# Patient Record
Sex: MALE | Race: WHITE | HISPANIC OR LATINO | URBAN - METROPOLITAN AREA
[De-identification: names, ages, dates, MRNs, and addresses within clinical notes are randomized per-mention and may not be internally consistent; named-entity substitution may affect disease eponyms.]

---

## 2022-12-21 ENCOUNTER — EMERGENCY (EMERGENCY)
Facility: HOSPITAL | Age: 22
LOS: 1 days | Discharge: HOME | End: 2022-12-21
Attending: EMERGENCY MEDICINE | Admitting: INTERNAL MEDICINE
Payer: COMMERCIAL

## 2022-12-21 VITALS
RESPIRATION RATE: 18 BRPM | WEIGHT: 130.07 LBS | HEIGHT: 66 IN | SYSTOLIC BLOOD PRESSURE: 128 MMHG | TEMPERATURE: 100 F | DIASTOLIC BLOOD PRESSURE: 51 MMHG | OXYGEN SATURATION: 99 % | HEART RATE: 118 BPM

## 2022-12-21 DIAGNOSIS — J36 PERITONSILLAR ABSCESS: ICD-10-CM

## 2022-12-21 PROCEDURE — 99284 EMERGENCY DEPT VISIT MOD MDM: CPT

## 2022-12-21 RX ORDER — IBUPROFEN 200 MG
600 TABLET ORAL ONCE
Refills: 0 | Status: COMPLETED | OUTPATIENT
Start: 2022-12-21 | End: 2022-12-21

## 2022-12-21 RX ADMIN — Medication 600 MILLIGRAM(S): at 23:35

## 2022-12-21 NOTE — CONSULT NOTE ADULT - SUBJECTIVE AND OBJECTIVE BOX
throat pain. suspected peritonsillar abcess.   Reason for Consult/Chief Complaint:    HEALTH ISSUES - PROBLEM Dx:        PAST MEDICAL & SURGICAL HISTORY:    Physical Exam  Vital Signs Last 24 Hrs  T(C): 37.7 (21 Dec 2022 20:30), Max: 37.7 (21 Dec 2022 20:30)  T(F): 99.8 (21 Dec 2022 20:30), Max: 99.8 (21 Dec 2022 20:30)  HR: 118 (21 Dec 2022 20:30) (118 - 118)  BP: 128/51 (21 Dec 2022 20:30) (128/51 - 128/51)  BP(mean): --  RR: 18 (21 Dec 2022 20:30) (18 - 18)  SpO2: 99% (21 Dec 2022 20:30) (99% - 99%)    Parameters below as of 21 Dec 2022 20:30  Patient On (Oxygen Delivery Method): room air        General: well developed, well nourished patient in mild  apparent distress. Communication and voice - normal.   Head and Face: no swellings, lesions, tumors of masses. No tenderness to palpation or percussion. Facial strenth is normal.    Ears: external auditory canals, Tympanic membranes and middle ear space appeared normal bilaterally, normal hearing to speech. inspection of outer ear normal.   Nose: no signs of infection, bleeding or polyps. septum fairly midline. no signs of infection, polyps or bleeding.   Oral cavity / Oropharynx: tonsillitis with right peritonsillar bulge.   Neck: no lesions tumors, masses or swollen glands.   Thyroid - no nodules or thyromegaly noted.     Abnormal findings:  GERD ( Gastroesphageal Reflux) and LPR ( Laryngopharyngeal reflux) findings were noted - mild irregularity and heaped up mucosa in the post-cricoid region.     Radiologic studies:

## 2022-12-21 NOTE — ED PROVIDER NOTE - PROGRESS NOTE DETAILS
Pt was seen by Dr. Luis, ENT who drained abscess, cx sent, advised can d/c home on clindamycin and f/u office. Reevaluated patient at bedside.  Patient feeling much improved. An opportunity to ask questions was given.  Discussed the importance of prompt, close medical follow-up.  Patient will return with any changes, concerns or persistent / worsening symptoms.  Understanding of all instructions verbalized. Pt was seen by Dr. Luis, ENT who performed I&D of abscess at bedside, cx sent, advised can d/c home on clindamycin and f/u office.

## 2022-12-21 NOTE — ED PROVIDER NOTE - OBJECTIVE STATEMENT
22-year-old male with no past medical history presents with complaint of throat pain x3 days.  States that patient was seen in urgent care and tested positive for strep, was given amoxicillin however states that pain and swelling has worsened.  States that he went to urgent care again tonight and sent to ED for evaluation.  Denies fever, nausea, vomiting, abdominal pain, difficulty breathing, drooling.  States that he is able to swallow liquids however with pain.

## 2022-12-21 NOTE — ED PROVIDER NOTE - NSFOLLOWUPINSTRUCTIONS_ED_ALL_ED_FT
Follow-up with ENT for reevaluation, ongoing care and treatment.  Rest, drink plenty of fluids, take full course of antibiotics as prescribed.  Take Tylenol or Motrin as directed for pain. If having worsening of symptoms, difficulty breathing, unable to swallow liquids or other related symptoms, return to the ER immediately.    Peritonsillar Abscess    An open mouth showing the tonsils.   A peritonsillar abscess is an infected area in your throat that is filled with pus. It forms behind your tonsils.      What are the causes?    This condition is usually caused by streptococcal bacteria.      What increases the risk?    •A recent diagnosis of an infection in your mouth or throat.      •Being a smoker.      •Having gum disease or gingivitis (periodontal disease).        What are the signs or symptoms?    Early symptoms of this condition include:  •Fever and chills.      •A sore throat, often with pain on just one side.      •Swollen, tender glands (lymph nodes) in the neck.      •Headache.      As the infection gets worse, symptoms may include:  •Difficulty swallowing.      •Drooling because of difficulty swallowing saliva.      •Difficulty opening your mouth.      •Bad breath.      •Changes in how the voice sounds.        How is this treated?    •Draining the pus. Your doctor may do this with a syringe and a needle or by making a cut in the abscess.      •Using antibiotic medicine.        Follow these instructions at home:    Medicines     •Take over-the-counter and prescription medicines only as told by your doctor.      •If you were prescribed an antibiotic, take it as told by your doctor. Do not stop taking the antibiotic even if you start to feel better.        Eating and drinking   A diet of soft foods, including apple sauce, yogurt, and a smoothie.    •Drink enough fluid to keep your pee (urine) pale yellow.    •While your throat is sore, try one of these:  •Only drinking liquids.      •Eating only soft foods, such as yogurt and ice cream.        General instructions     •Rest as told by your doctor.      •Return to your normal activities as told by your doctor. Ask your doctor what activities are safe for you.    •If your abscess was drained, rinse your mouth often with salt water.  •To make salt water, dissolve ½–1 tsp (3–6 g) of salt in 1 cup (237 mL) of warm water.      •Do not swallow this mixture.        • Do not smoke or use any products that contain nicotine or tobacco. If you need help quitting, ask your doctor.      •Keep all follow-up visits.        Contact a doctor if:    •You have more pain, swelling, redness, or pus in your throat.      •You have a headache.      •You have low energy or feel generally sick.      •You have a fever or chills.      •You have trouble swallowing or eating.    •You have signs of not enough water in the body (dehydration), such as:  •Feeling light-headed or dizzy when you are standing.      •Peeing (urinating) less than usual.      •A fast heart rate.      •Dry mouth.          Get help right away if:    •You are unable to swallow.      •You have trouble breathing.      •Breathing is easier when you lean forward.      •You cough up blood.      •You vomit blood.      •You have very bad throat pain and it does not get better with medicine.      These symptoms may be an emergency. Get help right away. Call your local emergency services (911 in the [place]   • Do not wait to see if the symptoms will go away.       • Do not drive yourself to the hospital.         Summary    •A peritonsillar abscess is an infected area in your throat that is filled with pus.      •You may be treated by having the abscess drained and by taking antibiotic medicine.      •Contact a doctor if you have trouble swallowing or eating.      •Get help right away if you cough up blood or you vomit blood.      This information is not intended to replace advice given to you by your health care provider. Make sure you discuss any questions you have with your health care provider.

## 2022-12-21 NOTE — ED PROVIDER NOTE - CLINICAL SUMMARY MEDICAL DECISION MAKING FREE TEXT BOX
Patient complaining of sore throat since December 18.  Patient was seen that day in urgent care had a positive strep test was given amoxicillin however pain and swelling has worsened went to the urgent care tonight and they referred patient to the ER.  No fevers chills nausea vomiting.  Patient tolerating p.o.    Patient with peritonsillar abscess on exam will consult ENT for drainage.

## 2022-12-21 NOTE — ED PROVIDER NOTE - CARE PROVIDER_API CALL
Les Luis)  Otolaryngology  [address] [place] [id number]  Phone: [phone]  Fax: [phone]  Follow Up Time: 1-3 Days

## 2022-12-21 NOTE — CONSULT NOTE ADULT - ASSESSMENT
right peritonsillar abcess.   discussed with patient and family this condition, treatment options and risks benefits and complications. agree with draining abcess. hurricaine spray. sensrcaine with 1:200,000 epi injection. needle aspiration of 4 cc pus, sent for culture. cavity opened with hemostat and more pus aspirated and suctioned.

## 2022-12-21 NOTE — ED ADULT NURSE NOTE - OBJECTIVE STATEMENT
pt is A&Ox4, brought in to the ER for sore throat, abscess noted, ENT at the bedside, pt appears in no acute distress, resp even and unlabored, nad noted, will continue to monitor

## 2022-12-21 NOTE — ED PROVIDER NOTE - ENMT, MLM
Airway patent. Mouth with normal mucosa. +right peritonsillar swelling with erythema and abscess noted, no drooling/stridor/hoarseness noted, swallowing secretions and speaking clearly in full sentences

## 2022-12-21 NOTE — ED ADULT TRIAGE NOTE - CHIEF COMPLAINT QUOTE
Pt went to urgent care 4 days ago and dx with strep throat. pt has been taking amoxicillin but its not getting better. Pt advised to come to ED for possible abscess in tonsil. Pt has difficulty talking /eating and swollowing.

## 2022-12-21 NOTE — ED PROVIDER NOTE - PATIENT PORTAL LINK FT
You can access the FollowMyHealth Patient Portal offered by [facility] by registering at the following website: [email or web address]. By joining [employer]’s FollowMyHealth portal, you will also be able to view your health information using other applications (apps) compatible with our system.

## 2022-12-21 NOTE — ED ADULT NURSE NOTE - PRIMARY CARE PROVIDER
not affiliated [Feeling Poorly] : feeling poorly [Feeling Tired] : feeling tired [As Noted in HPI] : as noted in HPI [Arthralgias] : arthralgias [Joint Pain] : joint pain [Negative] : Heme/Lymph [Fever] : no fever [Chills] : no chills

## 2022-12-22 VITALS
RESPIRATION RATE: 18 BRPM | TEMPERATURE: 99 F | OXYGEN SATURATION: 99 % | DIASTOLIC BLOOD PRESSURE: 78 MMHG | HEART RATE: 107 BPM | SYSTOLIC BLOOD PRESSURE: 132 MMHG

## 2022-12-22 PROCEDURE — 87205 SMEAR GRAM STAIN: CPT

## 2022-12-22 PROCEDURE — 99284 EMERGENCY DEPT VISIT MOD MDM: CPT | Mod: 25

## 2022-12-22 PROCEDURE — 87070 CULTURE OTHR SPECIMN AEROBIC: CPT

## 2022-12-22 PROCEDURE — 42700 I&D ABSCESS PERITONSILLAR: CPT

## 2022-12-22 PROCEDURE — 87077 CULTURE AEROBIC IDENTIFY: CPT

## 2022-12-22 RX ADMIN — Medication 300 MILLIGRAM(S): at 00:30

## 2022-12-22 RX ADMIN — Medication 600 MILLIGRAM(S): at 00:35

## 2022-12-24 LAB
CULTURE RESULTS: SIGNIFICANT CHANGE UP
SPECIMEN SOURCE: SIGNIFICANT CHANGE UP